# Patient Record
Sex: FEMALE | Race: WHITE | NOT HISPANIC OR LATINO | URBAN - METROPOLITAN AREA
[De-identification: names, ages, dates, MRNs, and addresses within clinical notes are randomized per-mention and may not be internally consistent; named-entity substitution may affect disease eponyms.]

---

## 2017-07-11 DIAGNOSIS — R92.2 INCONCLUSIVE MAMMOGRAM: ICD-10-CM

## 2018-11-15 ENCOUNTER — EMERGENCY (EMERGENCY)
Facility: HOSPITAL | Age: 43
LOS: 1 days | Discharge: HOME | End: 2018-11-15
Attending: EMERGENCY MEDICINE | Admitting: EMERGENCY MEDICINE
Payer: SELF-PAY

## 2018-11-15 VITALS
WEIGHT: 134.92 LBS | SYSTOLIC BLOOD PRESSURE: 119 MMHG | DIASTOLIC BLOOD PRESSURE: 83 MMHG | HEART RATE: 77 BPM | TEMPERATURE: 98 F | RESPIRATION RATE: 16 BRPM | OXYGEN SATURATION: 96 % | HEIGHT: 65 IN

## 2018-11-15 PROCEDURE — 70160 X-RAY EXAM OF NASAL BONES: CPT

## 2018-11-15 PROCEDURE — 70160 X-RAY EXAM OF NASAL BONES: CPT | Mod: 26

## 2018-11-15 PROCEDURE — 99283 EMERGENCY DEPT VISIT LOW MDM: CPT | Mod: 25

## 2018-11-15 PROCEDURE — 99283 EMERGENCY DEPT VISIT LOW MDM: CPT

## 2018-11-15 NOTE — ED PROVIDER NOTE - MEDICAL DECISION MAKING DETAILS
44 y/o F pt presents to the ED c/o nasal injury, epistaxis since today. Will XR nasal bones then reassess.

## 2018-11-15 NOTE — ED ADULT NURSE NOTE - OBJECTIVE STATEMENT
patient has poss broken nose today, patient stated [place] the shopping [place] and opening up a case and punched her nose accidently, no nose bleed and noted swollen nose, Pt is in no acute distress. c/o minimal pain at this time, ice bag is given. MD at bedside, will continue to monitor.

## 2018-11-15 NOTE — ED PROVIDER NOTE - OBJECTIVE STATEMENT
44 y/o F pt with no pertinent PMHx presents to the ED c/o nasal injury, epistaxis since today. Pt reports trying to unscrew an item while at home, states the item snapped and she hit her nose. Pt experienced epistaxis at time of incident but denies current bleeding. No further complaints at this time.

## 2018-11-15 NOTE — ED PROVIDER NOTE - ENMT, MLM
Airway patent, Nasal mucosa clear. Mouth with normal mucosa. No significant nasal deformity or bruising noted.

## 2018-11-15 NOTE — ED ADULT NURSE NOTE - NSIMPLEMENTINTERV_GEN_ALL_ED
Implemented All Universal Safety Interventions:  [place] to call system. Call bell, personal items and telephone within reach. Instruct patient to call for assistance. Room bathroom lighting operational. Non-slip footwear when patient is off stretcher. Physically safe environment: no spills, clutter or unnecessary equipment. Stretcher in lowest position, wheels locked, appropriate side rails in place.

## 2020-01-06 ENCOUNTER — APPOINTMENT (OUTPATIENT)
Dept: SURGERY | Facility: CLINIC | Age: 45
End: 2020-01-06
Payer: COMMERCIAL

## 2020-01-06 VITALS
HEART RATE: 86 BPM | BODY MASS INDEX: 21.66 KG/M2 | DIASTOLIC BLOOD PRESSURE: 78 MMHG | WEIGHT: 130 LBS | SYSTOLIC BLOOD PRESSURE: 117 MMHG | HEIGHT: 65 IN

## 2020-01-06 PROCEDURE — 99204 OFFICE O/P NEW MOD 45 MIN: CPT

## 2020-01-06 RX ORDER — FAMOTIDINE 40 MG/1
TABLET, FILM COATED ORAL
Refills: 0 | Status: ACTIVE | COMMUNITY

## 2020-01-08 NOTE — HISTORY OF PRESENT ILLNESS
[de-identified] : Patient referred by Dr. Pandey for evaluation of newly diagnosed papillary thyroid cancer. 14 years ago patient with thyroid dysfunction postpartum which resolved. Currently not on thyroid medication. Patient reports elevated thyroid antibodies. 7 years ago diagnosed with thyroid nodules followed by Dr. Teena Ling with prior benign biopsies. Patient has changed endocrinologists due to insurance.  Currently under the care of Dr. Ortiz. Thyroid ultrasound December 2019: Right lobe 5.6 x 2 x 1.6 CM with mid 6 mm nodularity stable. The left lobe 4.7 x 1.6 x 1.8 CM with 14 mm upper nodule not previously seen isthmus 8 mm hypoechoic nodule with microcalcifications slightly increased compared to prior study 2017.  12 mm TGDC noted 12 cm above manubrium. Biopsy left nodule benign, left isthmus positive for papillary thyroid cancer. Patient denies dysphagia reports occasional hoarseness with severe reflux, denies XRT.\par Patient with long hx of TGDC without change in size or inflammation.

## 2020-01-08 NOTE — CONSULT LETTER
[Dear  ___] : Dear  [unfilled], [Consult Letter:] : I had the pleasure of evaluating your patient, [unfilled]. [Consult Closing:] : Thank you very much for allowing me to participate in the care of this patient.  If you have any questions, please do not hesitate to contact me. [Please see my note below.] : Please see my note below. [DrNils  ___] : Dr. CRUZ [DrNils ___] : Dr. CRUZ [FreeTextEntry2] : Dr. Baird Port [FreeTextEntry3] : Sincerely yours,\par \par Karen Arauz MD, FACS\par Assistant Professor of Surgery\par [facility]

## 2020-01-08 NOTE — REASON FOR VISIT
[Initial Consultation] : an initial consultation for [Spouse] : spouse [Other: _____] : [unfilled] [FreeTextEntry2] : papillary thyroid cancer in [facility]

## 2020-01-08 NOTE — ASSESSMENT
[FreeTextEntry1] : Patient with newly diagnosed papillary thyroid cancer in a multinodular goiter. I recommended surgery for definitive diagnosis and treatment. I discussed a left thyroid lobectomy and isthmusectomy versus total thyroidectomy with the patient. She would like to proceed with a total thyroidectomy for definitive diagnosis. Potential need for postoperative radioactive iodine discussed if metastatic adenopathy identified.I have discussed the risks, benefits and alternative treatments which include but are not limited to bleeding, infection, numbness, hoarseness, hypocalcemia, scarring, and need for reoperation. I have answered the patient's questions. They will contact my office to schedule surgery.

## 2020-01-08 NOTE — PHYSICAL EXAM
[Normal] : palpation of lymph nodes is normal [de-identified] : no cervical or supraclavicular adenopathy, trachea midline, thyroid without enlargement with left nodule 1.5 cm smooth non tender.  1 cm smooth cyst at level of TGDC without tenderness or inflammation. [de-identified] : Skin:  normal appearance.  no rash, nodules, vesicles, or erythema,\par Musculoskeletal:  full range of motion and no deformities appreciated\par Neurological:  grossly intact\par Psychiatric:  oriented to person, place and time with appropriate affect

## 2020-01-17 ENCOUNTER — APPOINTMENT (OUTPATIENT)
Dept: SURGERY | Facility: CLINIC | Age: 45
End: 2020-01-17
Payer: COMMERCIAL

## 2020-01-17 PROCEDURE — 60252 REMOVAL OF THYROID: CPT

## 2020-01-22 ENCOUNTER — LABORATORY RESULT (OUTPATIENT)
Age: 45
End: 2020-01-22

## 2020-01-22 ENCOUNTER — APPOINTMENT (OUTPATIENT)
Dept: SURGERY | Facility: CLINIC | Age: 45
End: 2020-01-22
Payer: COMMERCIAL

## 2020-01-22 PROCEDURE — 36415 COLL VENOUS BLD VENIPUNCTURE: CPT

## 2020-01-22 PROCEDURE — 99024 POSTOP FOLLOW-UP VISIT: CPT

## 2020-01-22 NOTE — PHYSICAL EXAM
[de-identified] : no cervical or supraclavicular adenopathy, trachea midline, incision with mild swelling, scar min discussed [de-identified] : Skin:  normal appearance.  no rash, nodules, vesicles, or erythema,\par Musculoskeletal:  full range of motion and no deformities appreciated\par Neurological:  grossly intact\par Psychiatric:  oriented to person, place and time with appropriate affect [Normal] : palpation of lymph nodes is normal

## 2020-01-22 NOTE — HISTORY OF PRESENT ILLNESS
[de-identified] : Patient referred by Dr. Pandey for evaluation of newly diagnosed papillary thyroid cancer. 14 years ago patient with thyroid dysfunction postpartum which resolved. Currently not on thyroid medication. Patient reports elevated thyroid antibodies. 7 years ago diagnosed with thyroid nodules followed by Dr. Teena Ling with prior benign biopsies. Patient has changed endocrinologists due to insurance.  Currently under the care of Dr. Ortiz. Thyroid ultrasound December 2019: Right lobe 5.6 x 2 x 1.6 CM with mid 6 mm nodularity stable. The left lobe 4.7 x 1.6 x 1.8 CM with 14 mm upper nodule not previously seen isthmus 8 mm hypoechoic nodule with microcalcifications slightly increased compared to prior study 2017.  12 mm TGDC noted 12 cm above manubrium. Biopsy left nodule benign, left isthmus positive for papillary thyroid cancer. Patient denies dysphagia reports occasional hoarseness with severe reflux, denies XRT.\par Patient with long hx of TGDC without change in size or inflammation. \par 1/17/20 left thyroid lobectomy with paratracheal dissection and parathyroidectomy .  path 9 mm papillary with 1/4 LN positive with 1.5 mm focus, hypercellular parathyroid, denies dysphagia, hoarseness or pain. reports palpitations and anxiety.

## 2020-01-22 NOTE — ASSESSMENT
[FreeTextEntry1] : Patient with newly diagnosed papillary thyroid cancer in a multinodular goiter.  Doing well, multiple questions answered.  recommend observations. michael nair, will present at tumor board,  RTo 6 weeks.

## 2020-01-27 LAB
25(OH)D3 SERPL-MCNC: 47.7 NG/ML
CALCIUM SERPL-MCNC: 8.9 MG/DL
CALCIUM SERPL-MCNC: 8.9 MG/DL
PARATHYROID HORMONE INTACT: 16 PG/ML
T3 SERPL-MCNC: 93 NG/DL
T4 FREE SERPL-MCNC: 1.2 NG/DL
THYROGLOB AB SERPL-ACNC: <20 IU/ML
THYROGLOB SERPL-MCNC: 17.9 NG/ML
TSH SERPL-ACNC: 1.99 UIU/ML

## 2020-01-31 ENCOUNTER — RESULT REVIEW (OUTPATIENT)
Age: 45
End: 2020-01-31

## 2020-03-04 ENCOUNTER — APPOINTMENT (OUTPATIENT)
Dept: SURGERY | Facility: CLINIC | Age: 45
End: 2020-03-04
Payer: COMMERCIAL

## 2020-03-04 ENCOUNTER — LABORATORY RESULT (OUTPATIENT)
Age: 45
End: 2020-03-04

## 2020-03-04 PROCEDURE — 36415 COLL VENOUS BLD VENIPUNCTURE: CPT

## 2020-03-04 PROCEDURE — 99024 POSTOP FOLLOW-UP VISIT: CPT

## 2020-03-04 NOTE — PHYSICAL EXAM
[de-identified] : no cervical or supraclavicular adenopathy, trachea midline, incision with healing well. , scar min discussed [Normal] : palpation of lymph nodes is normal [de-identified] : Skin:  normal appearance.  no rash, nodules, vesicles, or erythema,\par Musculoskeletal:  full range of motion and no deformities appreciated\par Neurological:  grossly intact\par Psychiatric:  oriented to person, place and time with appropriate affect

## 2020-03-04 NOTE — ASSESSMENT
[FreeTextEntry1] : Patient with newly diagnosed papillary thyroid cancer in a multinodular goiter.  Doing well, multiple questions answered.  recommend observations. michael sent, thyroid US 7/2020, RTO 4 mo

## 2020-03-04 NOTE — HISTORY OF PRESENT ILLNESS
[de-identified] : Patient referred by Dr. Pandey for evaluation of newly diagnosed papillary thyroid cancer. 14 years ago patient with thyroid dysfunction postpartum which resolved. Currently not on thyroid medication. Patient reports elevated thyroid antibodies. 7 years ago diagnosed with thyroid nodules followed by Dr. Teena Ling with prior benign biopsies. Patient has changed endocrinologists due to insurance.  Currently under the care of Dr. Ortiz. Thyroid ultrasound December 2019: Right lobe 5.6 x 2 x 1.6 CM with mid 6 mm nodularity stable. The left lobe 4.7 x 1.6 x 1.8 CM with 14 mm upper nodule not previously seen isthmus 8 mm hypoechoic nodule with microcalcifications slightly increased compared to prior study 2017.  12 mm TGDC noted 12 cm above manubrium. Biopsy left nodule benign, left isthmus positive for papillary thyroid cancer. Patient denies dysphagia reports occasional hoarseness with severe reflux, denies XRT.\par Patient with long hx of TGDC without change in size or inflammation. \par 1/17/20 left thyroid lobectomy with paratracheal dissection and parathyroidectomy .  path 9 mm papillary with 1/4 LN positive with 1.5 mm focus, hypercellular parathyroid, denies dysphagia, hoarseness or pain. Patient's  palpitations and anxiety improving.  Case presented at tumor board, recommended observation inview of small focus in LN.  multiple questions answered.  Plans to see Dr Teena Ling.

## 2020-03-06 LAB
25(OH)D3 SERPL-MCNC: 40.1 NG/ML
CALCIUM SERPL-MCNC: 9 MG/DL
CALCIUM SERPL-MCNC: 9 MG/DL
PARATHYROID HORMONE INTACT: 26 PG/ML
T3 SERPL-MCNC: 98 NG/DL
T4 FREE SERPL-MCNC: 1.1 NG/DL
THYROGLOB AB SERPL-ACNC: <20 IU/ML
THYROGLOB SERPL-MCNC: 9.86 NG/ML
TSH SERPL-ACNC: 2.74 UIU/ML

## 2020-04-20 ENCOUNTER — LABORATORY RESULT (OUTPATIENT)
Age: 45
End: 2020-04-20

## 2020-04-20 ENCOUNTER — APPOINTMENT (OUTPATIENT)
Dept: SURGERY | Facility: CLINIC | Age: 45
End: 2020-04-20
Payer: COMMERCIAL

## 2020-04-20 DIAGNOSIS — E04.2 NONTOXIC MULTINODULAR GOITER: ICD-10-CM

## 2020-04-20 PROCEDURE — 36415 COLL VENOUS BLD VENIPUNCTURE: CPT

## 2020-04-20 PROCEDURE — 99214 OFFICE O/P EST MOD 30 MIN: CPT

## 2020-04-20 NOTE — PHYSICAL EXAM
[de-identified] : no cervical or supraclavicular adenopathy, trachea midline, incision with healing well. , scar min discussed.  Area of patient's concern at right posterior auricular area with subcm LN over styloid process, no suspicious findings.  [Normal] : palpation of lymph nodes is normal [de-identified] : Skin:  normal appearance.  no rash, nodules, vesicles, or erythema,\par Musculoskeletal:  full range of motion and no deformities appreciated\par Neurological:  grossly intact\par Psychiatric:  oriented to person, place and time with appropriate affect

## 2020-04-20 NOTE — ASSESSMENT
[FreeTextEntry1] : Patient with newly diagnosed papillary thyroid cancer in a multinodular goiter.  Doing well, multiple questions answered.  recommend observations. michael sent, patient will consider starting synthoid after levels reviewed.  thyroid US 7/2020, RTO 3 mo

## 2020-04-20 NOTE — HISTORY OF PRESENT ILLNESS
[de-identified] : Patient referred by Dr. Pandey for evaluation of newly diagnosed papillary thyroid cancer. 14 years ago patient with thyroid dysfunction postpartum which resolved. Currently not on thyroid medication. Patient reports elevated thyroid antibodies. 7 years ago diagnosed with thyroid nodules followed by Dr. Teena Ling with prior benign biopsies. Patient has changed endocrinologists due to insurance.  Currently under the care of Dr. Ortiz. Thyroid ultrasound December 2019: Right lobe 5.6 x 2 x 1.6 CM with mid 6 mm nodularity stable. The left lobe 4.7 x 1.6 x 1.8 CM with 14 mm upper nodule not previously seen isthmus 8 mm hypoechoic nodule with microcalcifications slightly increased compared to prior study 2017.  12 mm TGDC noted 12 cm above manubrium. Biopsy left nodule benign, left isthmus positive for papillary thyroid cancer. Patient denies dysphagia reports occasional hoarseness with severe reflux, denies XRT.\par Patient with long hx of TGDC without change in size or inflammation. \par 1/17/20 left thyroid lobectomy with paratracheal dissection and parathyroidectomy .  path 9 mm papillary with 1/4 LN positive with 1.5 mm focus, hypercellular parathyroid, denies dysphagia, hoarseness or pain. Patient's  palpitations and anxiety improving.  Case presented at tumor board, recommended observation in view of small focus in LN.  \par Patient saw  Dr Teena Ling who recommended starting low dose synthroid. She has not started it yet. Patient returns prior to scheduled appt with c/o right posterior auricular nodule with tenderness and intermittent dysphagia.  Patient scheduled next month for f/u of with GI.  Has restarted pepcid.

## 2020-04-27 LAB
25(OH)D3 SERPL-MCNC: 41 NG/ML
CALCIUM SERPL-MCNC: 9.2 MG/DL
CALCIUM SERPL-MCNC: 9.2 MG/DL
PARATHYROID HORMONE INTACT: 21 PG/ML
T3 SERPL-MCNC: 101 NG/DL
T4 FREE SERPL-MCNC: 0.9 NG/DL
THYROGLOB AB SERPL-ACNC: <20 IU/ML
THYROGLOB SERPL-MCNC: 22.7 NG/ML
TSH SERPL-ACNC: 3.61 UIU/ML

## 2020-07-06 ENCOUNTER — APPOINTMENT (OUTPATIENT)
Dept: SURGERY | Facility: CLINIC | Age: 45
End: 2020-07-06
Payer: COMMERCIAL

## 2020-07-06 ENCOUNTER — LABORATORY RESULT (OUTPATIENT)
Age: 45
End: 2020-07-06

## 2020-07-06 DIAGNOSIS — C77.9 SECONDARY AND UNSPECIFIED MALIGNANT NEOPLASM OF LYMPH NODE, UNSPECIFIED: ICD-10-CM

## 2020-07-06 DIAGNOSIS — C73 MALIGNANT NEOPLASM OF THYROID GLAND: ICD-10-CM

## 2020-07-06 DIAGNOSIS — E21.0 PRIMARY HYPERPARATHYROIDISM: ICD-10-CM

## 2020-07-06 PROCEDURE — 99214 OFFICE O/P EST MOD 30 MIN: CPT

## 2020-07-06 PROCEDURE — 36415 COLL VENOUS BLD VENIPUNCTURE: CPT

## 2020-07-06 NOTE — PHYSICAL EXAM
[de-identified] : no cervical or supraclavicular adenopathy, trachea midline, incision with healing well. , scar min discussed.   [Normal] : assessment of respiratory effort is normal [de-identified] : Skin:  normal appearance.  no rash, nodules, vesicles, or erythema,\par Musculoskeletal:  full range of motion and no deformities appreciated\par Neurological:  grossly intact\par Psychiatric:  oriented to person, place and time with appropriate affect

## 2020-07-06 NOTE — ASSESSMENT
[FreeTextEntry1] : Patient with newly diagnosed papillary thyroid cancer in a multinodular goiter with small focus of Cancer in LN.   Doing well, multiple questions answered.    Discussed observation versus completion thyroidectomy.  Patient considering second opinion at [facility] or with Dr Bass.    . michael nair,  thyroid US 1/2021, RTO 6 mo

## 2020-07-06 NOTE — HISTORY OF PRESENT ILLNESS
[de-identified] : Patient referred by Dr. Pandey for evaluation of newly diagnosed papillary thyroid cancer. 14 years ago patient with thyroid dysfunction postpartum which resolved. Currently not on thyroid medication. Patient reports elevated thyroid antibodies. 7 years ago diagnosed with thyroid nodules followed by Dr. Teena Ling with prior benign biopsies. Patient has changed endocrinologists due to insurance.  Currently under the care of Dr. Ortiz. Thyroid ultrasound December 2019: Right lobe 5.6 x 2 x 1.6 CM with mid 6 mm nodularity stable. The left lobe 4.7 x 1.6 x 1.8 CM with 14 mm upper nodule not previously seen isthmus 8 mm hypoechoic nodule with microcalcifications slightly increased compared to prior study 2017.  12 mm TGDC noted 12 cm above manubrium. Biopsy left nodule benign, left isthmus positive for papillary thyroid cancer. Patient denies dysphagia reports occasional hoarseness with severe reflux, denies XRT.\par Patient with long hx of TGDC without change in size or inflammation. \par 1/17/20 left thyroid lobectomy with paratracheal dissection and parathyroidectomy .  path 9 mm papillary with 1/4 LN positive with 1.5 mm focus, hypercellular parathyroid,  Case presented at tumor board, recommended observation in view of small focus in LN.  \par Patient saw  Dr Teena Ling who recommended starting low dose synthroid. Patient took 25 mcg fdaily for a while with c/o hair loss and abd weight gain. she decreased to QOD and stopped 5 days ago.   thyroid US 7/1/2020 no right thyroid nodules no adenopathy or evidence of recurrence.

## 2020-07-09 LAB
T3 SERPL-MCNC: 99 NG/DL
T4 FREE SERPL-MCNC: 1.1 NG/DL
THYROGLOB AB SERPL-ACNC: 20.1 IU/ML
THYROGLOB SERPL-MCNC: 2.38 NG/ML
TSH SERPL-ACNC: 2.95 UIU/ML

## 2021-01-11 ENCOUNTER — APPOINTMENT (OUTPATIENT)
Dept: SURGERY | Facility: CLINIC | Age: 46
End: 2021-01-11

## 2021-08-20 NOTE — ED ADULT TRIAGE NOTE - WEIGHT IN LBS
Aware of likely dc home tomorrow with [facility]. This writer contacted [facility] liaison Betina Peace) [phone], to make him aware of potential dc.     Sissy Holden RN 134.9

## 2021-09-22 ENCOUNTER — NON-APPOINTMENT (OUTPATIENT)
Age: 46
End: 2021-09-22

## 2022-02-14 NOTE — ED PROVIDER NOTE - AGGRAVATING FACTORS
He does admit to fatigue  He is fatigued throughout the day  He does report a nightmares at night vivid dreams  He also describes movements in his legs  He may have a REM disorder  I have asked him to see sleep determine there is any other causes  He may require a sleep evaluation he also gives history of restlessness in his legs  I have asked him to obtain an iron profile  He does admit to eating well but after the and profile is done he can add some more spinach to his diet  We did provide him with a prescription of gabapentin of 300 mg at bedtime he can try    The excessive drowsiness may be and fatigue may be from a lack of restorative sleep none

## 2022-05-23 ENCOUNTER — RESULT REVIEW (OUTPATIENT)
Age: 47
End: 2022-05-23

## 2023-01-27 ENCOUNTER — EMERGENCY (EMERGENCY)
Facility: HOSPITAL | Age: 48
LOS: 1 days | Discharge: HOME | End: 2023-01-27
Attending: STUDENT IN AN ORGANIZED HEALTH CARE EDUCATION/TRAINING PROGRAM | Admitting: STUDENT IN AN ORGANIZED HEALTH CARE EDUCATION/TRAINING PROGRAM
Payer: COMMERCIAL

## 2023-01-27 VITALS
WEIGHT: 130.07 LBS | SYSTOLIC BLOOD PRESSURE: 127 MMHG | RESPIRATION RATE: 16 BRPM | OXYGEN SATURATION: 96 % | HEIGHT: 65 IN | DIASTOLIC BLOOD PRESSURE: 78 MMHG | HEART RATE: 83 BPM | TEMPERATURE: 98 F

## 2023-01-27 LAB — S PYO DNA THROAT QL NAA+PROBE: SIGNIFICANT CHANGE UP

## 2023-01-27 PROCEDURE — 87651 STREP A DNA AMP PROBE: CPT

## 2023-01-27 PROCEDURE — 87798 DETECT AGENT NOS DNA AMP: CPT

## 2023-01-27 PROCEDURE — 99284 EMERGENCY DEPT VISIT MOD MDM: CPT

## 2023-01-27 PROCEDURE — 99282 EMERGENCY DEPT VISIT SF MDM: CPT

## 2023-01-27 NOTE — ED ADULT NURSE NOTE - BREATHING, MLM
"Subjective:       Patient ID: Cinthya Hernandez is a 22 y.o. female.    Vitals:  height is 5' 5.5" (1.664 m) and weight is 84.8 kg (187 lb). Her oral temperature is 98 °F (36.7 °C). Her blood pressure is 114/68 and her pulse is 81. Her respiration is 16 and oxygen saturation is 97%.     Chief Complaint: Sore Throat (Yesterday started with a headache, nausea, body aches, throat started to hurt a little but was worse this morning.  Hurts to swallow)    Patient is a 22-year-old female who presents to clinic for evaluation of sore throat.  Patient reports she is not vaccinated for flu or influenza.  Patient reports positive sick exposure to both family members and coworkers.  Patient states her coworkers have tested positive for the flu.  Patient states no over-the-counter medications for symptoms at this point.  Patient reports symptoms times 2-3 days now.  Patient complaining of fatigue, chills, nasal sinus congestion with postnasal drainage, sore throat with painful swallowing, a slight nonproductive cough, intermittent episodes of nausea, generalized body aches, and generalized headaches.  Patient denies any acute dizziness, ear pain, chest pain or palpitations, shortness of breath or abnormal breath sounds, abdominal pain, vomiting or diarrhea, urinary symptoms, rash, or change in mentation.      Constitution: Positive for chills and fatigue.   HENT: Positive for congestion, postnasal drip, sore throat and trouble swallowing (Painful swallowing). Negative for ear pain.    Neck: neck negative. Negative for painful lymph nodes.   Cardiovascular: Negative.  Negative for chest pain and palpitations.   Eyes: Negative.    Respiratory: Positive for cough (Not bad). Negative for chest tightness, shortness of breath and wheezing.    Gastrointestinal: Positive for nausea. Negative for abdominal pain, vomiting and diarrhea.   Endocrine: negative.   Genitourinary: Negative.  Negative for dysuria, frequency and urgency. "   Musculoskeletal: Positive for muscle ache.   Skin: Negative.  Negative for color change, pale, rash and erythema.   Allergic/Immunologic: Negative.    Neurological: Positive for headaches. Negative for dizziness, light-headedness, passing out, disorientation and altered mental status.   Hematologic/Lymphatic: Negative.  Negative for swollen lymph nodes.   Psychiatric/Behavioral: Negative.  Negative for altered mental status, disorientation and confusion.       Objective:      Physical Exam   Constitutional: She is oriented to person, place, and time. She appears well-developed. She is cooperative.  Non-toxic appearance. She appears ill (Uncomfortable). No distress.   HENT:   Head: Normocephalic and atraumatic.   Ears:   Right Ear: Hearing, tympanic membrane, external ear and ear canal normal.   Left Ear: Hearing, tympanic membrane, external ear and ear canal normal.   Nose: Congestion present. No mucosal edema, rhinorrhea or nasal deformity. No epistaxis. Right sinus exhibits no maxillary sinus tenderness and no frontal sinus tenderness. Left sinus exhibits no maxillary sinus tenderness and no frontal sinus tenderness.   Mouth/Throat: Uvula is midline and mucous membranes are normal. Mucous membranes are moist. No trismus in the jaw. Normal dentition. No uvula swelling. Posterior oropharyngeal erythema and cobblestoning present. Tonsils are 2+ on the right. Tonsils are 1+ on the left. Tonsillar exudate. Oropharynx is clear.   Eyes: Conjunctivae and lids are normal. Pupils are equal, round, and reactive to light. Right eye exhibits no discharge. Left eye exhibits no discharge. No scleral icterus.   Neck: Trachea normal and phonation normal. Neck supple. No neck rigidity present.   Cardiovascular: Normal rate, regular rhythm, normal heart sounds and normal pulses.   Pulmonary/Chest: Effort normal and breath sounds normal. No respiratory distress. She has no wheezes. She has no rhonchi. She has no rales.   Abdominal:  Normal appearance and bowel sounds are normal. She exhibits no distension. Soft. There is no abdominal tenderness.   Musculoskeletal: Normal range of motion.         General: No deformity. Normal range of motion.   Lymphadenopathy:     She has cervical adenopathy.   Neurological: She is alert and oriented to person, place, and time. She exhibits normal muscle tone. Coordination normal.   Skin: Skin is warm, dry, intact, not diaphoretic, not pale and no rash. Capillary refill takes less than 2 seconds. No erythema   Psychiatric: Her speech is normal and behavior is normal. Judgment and thought content normal.   Nursing note and vitals reviewed.        Assessment:       1. Sore throat    2. Generalized body aches    3. Acute pharyngitis, unspecified etiology          Plan:         Sore throat  -     POCT Influenza A/B  -     POCT rapid strep A  -     SARS Coronavirus 2 Antigen, POCT Manual Read    Generalized body aches  -     POCT Influenza A/B  -     POCT rapid strep A  -     SARS Coronavirus 2 Antigen, POCT Manual Read    Acute pharyngitis, unspecified etiology    Other orders  -     amoxicillin (AMOXIL) 500 MG Tab; Take 1 tablet (500 mg total) by mouth every 12 (twelve) hours. for 10 days  Dispense: 20 tablet; Refill: 0  -     benzocaine-menthoL (CHLORASEPTIC MAX) 15-10 mg Lozg; 1 lozenge by Mucous Membrane route every 2 (two) hours as needed (Sore throat).  Dispense: 30 lozenge; Refill: 0                 Labs:  Rapid strep positive.  Influenza A and B negative.  COVID negative.  Take medications as prescribed.  Tylenol/Motrin per package instructions for any pain or fever.  Recommend warm salt water gargles every 2-3 hours while awake.  Recommend replacing toothbrush and washing linens in hot water 24-48 hours after starting antibiotics.  Follow-up with PCP in 1-2 days.  Follow-up ENT as needed.  Return to clinic as needed.  To ED for any new or acutely worsening symptoms.  Patient in agreement with plan of  care.    DISCLAIMER: Please note that my documentation in this Electronic Healthcare Record was produced using speech recognition software and therefore may contain errors related to that software system.These could include grammar, punctuation and spelling errors or the inclusion/exclusion of phrases that were not intended. Garbled syntax, mangled pronouns, and other bizarre constructions may be attributed to that software system.   Spontaneous, unlabored and symmetrical

## 2023-01-28 VITALS
HEART RATE: 69 BPM | OXYGEN SATURATION: 98 % | RESPIRATION RATE: 18 BRPM | DIASTOLIC BLOOD PRESSURE: 74 MMHG | SYSTOLIC BLOOD PRESSURE: 113 MMHG | TEMPERATURE: 98 F

## 2023-01-28 DIAGNOSIS — Z90.89 ACQUIRED ABSENCE OF OTHER ORGANS: Chronic | ICD-10-CM

## 2023-01-28 RX ORDER — LEVOTHYROXINE SODIUM 125 MCG
1 TABLET ORAL
Qty: 0 | Refills: 0 | DISCHARGE

## 2023-01-28 RX ORDER — FLUOXETINE HCL 10 MG
1 CAPSULE ORAL
Qty: 0 | Refills: 0 | DISCHARGE

## 2023-01-28 NOTE — ED PROVIDER NOTE - PATIENT PORTAL LINK FT
You can access the FollowMyHealth Patient Portal offered by [facility] by registering at the following website: [email or web address]. By joining [facility] FollowMyHealth portal, you will also be able to view your health information using other applications (apps) compatible with our system.

## 2023-01-28 NOTE — ED PROVIDER NOTE - CLINICAL SUMMARY MEDICAL DECISION MAKING FREE TEXT BOX
47 year old female p/w right sided throat pain x 2 days.  No fever.  Tolerating PO.  Saw PMD yesterday, started on Doxy but no strep test done.  VSS, no acute distress, no focal findings on ENT exam.  Strep PCR, consider CT neck soft tissue, ENT follow up, r/o strep, abscess, viral pharyngitis

## 2023-01-28 NOTE — ED PROVIDER NOTE - OBJECTIVE STATEMENT
47 year old female with a history of thyroid CA presents with right-sided throat pain x 2 days.  She states it hurts when she swallows but is able to tolerate PO.  Denies fever, headache, ear pain, drooling.  She saw her PMD yesterday who did blood work (results pending) and started patient on Doxycycline 100mg which she has taken for the last 2 days.  Patient states she was not tested for strep.  Prior to start of symptoms, patient had 2 weeks of viral URI sx which have now resolved.   She is scheduled to see her endocrinologist next week to have a routine thyroid ultrasound.  PMD Petar Schreiber

## 2023-01-28 NOTE — ED PROVIDER NOTE - PROGRESS NOTE DETAILS
Results of strep d/w patient.  Offered CT neck soft tissue but patient declining.  States she would continue abx, gargle, and use chloroseptic spray.  Understands to return to the ED for any concerns or changes.  Vitals stable, patient tolerating PO and in no acute distress.

## 2023-01-28 NOTE — ED PROVIDER NOTE - ENMT, MLM
Airway patent, Nasal mucosa clear. Mouth with normal mucosa. Throat has no vesicles, no oropharyngeal exudates and uvula is midline.  +Enlarged anterior cervical lymphadenopathy bilaterally. TMs visualized and clear

## 2023-01-28 NOTE — ED PROVIDER NOTE - NSFOLLOWUPINSTRUCTIONS_ED_ALL_ED_FT
Please follow up with your primary care doctor and ENT.  Continue taking your antibiotic.  Return to the ER for persistent sore throat, fever, inability to tolerate liquids, inability to swallow, headache, or any other concerns.       Pharyngitis    Upper body outline including the pharynx.   Pharyngitis is a sore throat (pharynx). This is when there is redness, pain, and swelling in your throat. Most of the time, this condition gets better on its own. In some cases, you may need medicine.      What are the causes?    •An infection from a virus.      •An infection from bacteria.      •Allergies.        What increases the risk?    •Being 5–24 years old.    •Being in crowded environments. These include:  •Daycares.      •Schools.      •Dormitories.        •Living in a place with cold temperatures outside.      •Having a weakened disease-fighting (immune) system.        What are the signs or symptoms?    Symptoms may vary depending on the cause. Common symptoms include:  •Sore throat.      •Tiredness (fatigue).      •Low-grade fever.      •Stuffy nose.      •Cough.      •Headache.      Other symptoms may include:  •Glands in the neck (lymph nodes) that are swollen.      •Skin rashes.      •Film on the throat or tonsils. This can be caused by an infection from bacteria.      •Vomiting.      •Red, itchy eyes.      •Loss of appetite.      •Joint pain and muscle aches.      •Tonsils that are temporarily bigger than usual (enlarged).        How is this treated?    Many times, treatment is not needed. This condition usually gets better in 3–4 days without treatment.    If the infection is caused by a bacteria, you may be need to take antibiotics.      Follow these instructions at home:    Medicines     •Take over-the-counter and prescription medicines only as told by your doctor.      •If you were prescribed an antibiotic medicine, take it as told by your doctor. Do not stop taking the antibiotic even if you start to feel better.      •Use throat lozenges or sprays to soothe your throat as told by your doctor.      •Children can get pharyngitis. Do not give your child aspirin.        Managing pain   A cup of hot tea.   To help with pain, try:•Sipping warm liquids, such as:  •Broth.      •Herbal tea.      •Warm water.        •Eating or drinking cold or frozen liquids, such as frozen ice pops.    •Rinsing your mouth (gargle) with a salt water mixture 3–4 times a day or as needed.  •To make salt water, dissolve ½–1 tsp (3–6 g) of salt in 1 cup (237 mL) of warm water.      •Do not swallow this mixture.        •Sucking on hard candy or throat lozenges.      •Putting a cool-mist humidifier in your bedroom at night to moisten the air.      •Sitting in the bathroom with the door closed for 5–10 minutes while you run hot water in the shower.        General instructions   A do not smoke cigarettes sign.   • Do not smoke or use any products that contain nicotine or tobacco. If you need help quitting, ask your doctor.      •Rest as told by your doctor.      •Drink enough fluid to keep your pee (urine) pale yellow.        How is this prevented?    •Wash your hands often for at least 20 seconds with soap and water. If soap and water are not available, use hand .      • Do not touch your eyes, nose, or mouth with unwashed hands. Wash hands after touching these areas.      • Do not share cups or eating utensils.      •Avoid close contact with people who are sick.        Contact a doctor if:    •You have large, tender lumps in your neck.      •You have a rash.      •You cough up green, yellow-brown, or bloody spit.        Get help right away if:    •You have a stiff neck.      •You drool or cannot swallow liquids.      •You cannot drink or take medicines without vomiting.      •You have very bad pain that does not go away with medicine.      •You have problems breathing, and it is not from a stuffy nose.      •You have new pain and swelling in your knees, ankles, wrists, or elbows.      These symptoms may be an emergency. Get help right away. Call your local emergency services (911 in the [place]   • Do not wait to see if the symptoms will go away.       • Do not drive yourself to the hospital.         Summary    •Pharyngitis is a sore throat (pharynx). This is when there is redness, pain, and swelling in your throat.      •Most of the time, pharyngitis gets better on its own. Sometimes, you may need medicine.      •If you were prescribed an antibiotic medicine, take it as told by your doctor. Do not stop taking the antibiotic even if you start to feel better.      This information is not intended to replace advice given to you by your health care provider. Make sure you discuss any questions you have with your health care provider.

## 2023-03-31 NOTE — ED PROVIDER NOTE - CARE PROVIDER_API CALL
No
Petar Schreiber AND [facility] [place] [address]  Phone: [phone]  Fax: [phone]  Follow Up Time:     Jarvis Hair)  Otolaryngology  [address], [address]  [place] [address]  Phone: [phone]  Fax: [phone]  Follow Up Time:

## 2024-12-24 ENCOUNTER — EMERGENCY (EMERGENCY)
Facility: HOSPITAL | Age: 49
LOS: 1 days | Discharge: HOME | End: 2024-12-24
Attending: EMERGENCY MEDICINE | Admitting: EMERGENCY MEDICINE
Payer: COMMERCIAL

## 2024-12-24 VITALS
DIASTOLIC BLOOD PRESSURE: 84 MMHG | HEART RATE: 104 BPM | HEIGHT: 66 IN | OXYGEN SATURATION: 99 % | SYSTOLIC BLOOD PRESSURE: 124 MMHG | WEIGHT: 130.07 LBS | RESPIRATION RATE: 16 BRPM | TEMPERATURE: 98 F

## 2024-12-24 DIAGNOSIS — Z90.89 ACQUIRED ABSENCE OF OTHER ORGANS: Chronic | ICD-10-CM

## 2024-12-24 PROBLEM — Z86.59 PERSONAL HISTORY OF OTHER MENTAL AND BEHAVIORAL DISORDERS: Chronic | Status: ACTIVE | Noted: 2023-01-28

## 2024-12-24 PROBLEM — C73 MALIGNANT NEOPLASM OF THYROID GLAND: Chronic | Status: ACTIVE | Noted: 2023-01-28

## 2024-12-24 PROCEDURE — 99284 EMERGENCY DEPT VISIT MOD MDM: CPT | Mod: 25

## 2024-12-24 PROCEDURE — 12001 RPR S/N/AX/GEN/TRNK 2.5CM/<: CPT

## 2024-12-24 RX ORDER — LIDOCAINE HCL 20 MG/ML
10 VIAL (ML) INJECTION ONCE
Refills: 0 | Status: COMPLETED | OUTPATIENT
Start: 2024-12-24 | End: 2024-12-24

## 2024-12-24 RX ADMIN — Medication 10 MILLILITER(S): at 13:18

## 2024-12-24 NOTE — ED PROVIDER NOTE - PROGRESS NOTE DETAILS
dr mcmillan hand evaluated pt at bedside advised out tp follow up . pt advised on wound care. will dave

## 2024-12-24 NOTE — ED ADULT NURSE NOTE - NSICDXPASTSURGICALHX_GEN_ALL_CORE_FT
----- Message from RT Frida sent at 10/2/2018  7:34 AM CDT -----  Contact: pt    pt , requesting an appt to be worked in today: Anxiety and insomnia issues, thanks.   PAST SURGICAL HISTORY:  History of tonsillectomy

## 2024-12-24 NOTE — ED PROVIDER NOTE - ATTENDING APP SHARED VISIT CONTRIBUTION OF CARE
Patient complaining of laceration to tip of right fifth finger status post accidentally cutting herself while using a sharp knife.  Patient denies any other complaints.  Patient relates allergic to tetanus vaccine.  Patient declined x-ray.    Plan laceration repair

## 2024-12-24 NOTE — ED PROVIDER NOTE - PATIENT PORTAL LINK FT
You can access the FollowMyHealth Patient Portal offered by [facility] by registering at the following website: [email or web address]. By joining [employer]’s FollowMyHealth portal, you will also be able to view your health information using other applications (apps) compatible with our system.

## 2024-12-24 NOTE — ED ADULT NURSE NOTE - OBJECTIVE STATEMENT
patient A&Ox3 in no acute distress c/o of R hand 5 th finger laceration, cut her finger with house knife

## 2024-12-24 NOTE — ED PROVIDER NOTE - CARE PROVIDER_API CALL
Yes Abel Aguayo  Plastic Surgery  [address] [place] [address]  Phone: [phone]  Fax: [phone]  Follow Up Time: 4-6 Days

## 2024-12-24 NOTE — ED ADULT NURSE REASSESSMENT NOTE - NS ED NURSE REASSESS COMMENT FT1
patient A&Ox3 in no acute distress discharge as orders refuses the discharge vital sign , no heplock left Er self ambulated

## 2024-12-24 NOTE — ED PROVIDER NOTE - OBJECTIVE STATEMENT
Patient a 49-year-old female with no significant past medical history presents with right hand fifth digit laceration.  Patient reports she cut the tip of her pinky using a sharp knife.  Patient allergic to tetanus vaccine so was unable to receive it.  Patient denies decreased range of motion.

## 2024-12-24 NOTE — ED PROVIDER NOTE - SKIN, MLM
Skin normal color for race, warm, dry lacerated tip of right hand 5th digit Skin normal color for race, warm, dry lacerated tip of right hand 5th digit FORM NVI

## 2024-12-24 NOTE — ED PROVIDER NOTE - NSFOLLOWUPINSTRUCTIONS_ED_ALL_ED_FT
Laceration    A laceration is a cut that goes through all of the layers of the skin and into the tissue that is right under the skin. Some lacerations heal on their own. Others need to be closed with skin adhesive strips, skin glue, stitches (sutures), or staples. Proper laceration care minimizes the risk of infection and helps the laceration to heal better.  If non-absorbable stitches or staples have been placed, they must be taken out within the time frame instructed by your healthcare provider.    SEEK IMMEDIATE MEDICAL CARE IF YOU HAVE ANY OF THE FOLLOWING SYMPTOMS: swelling around the wound, worsening pain, drainage from the wound, red streaking going away from your wound, inability to move finger or toe near the laceration, or discoloration of skin near the laceration.  1. FOLLOW UP WITH YOUR PRIMARY DOCTOR IN 24-48 HOURS.   2. FOLLOW UP WITH ALL SPECIALIST DISCUSSED DURING YOUR VISIT.   3. TAKE ALL MEDICATIONS PRESCRIBED IN THE ER IF ANY ARE PRESCRIBED. CONTINUE YOUR HOME MEDICATIONS UNLESS OTHERWISE ADVISED DIFFERENTLY.   4. RETURN FOR WORSENING SYMPTOMS OR CONCERNS INCLUDING BUT NOT LIMITED TO FEVER, CHEST PAIN, OR TROUBLE BREATHING OR ANY OTHER CONCERNS  bacitracin twice daily for 10 days  keep wound dry and clean  return in 10 days for suture removal  wound check in 7 days

## 2025-05-22 NOTE — ED PROVIDER NOTE - HISTORY ATTESTATION, MLM
Treatment Goal Explanation (Does Not Render In The Note): Stable for the purposes of categorizing medical decision making is defined by the specific treatment goals for an individual patient. A patient that is not at their treatment goal is not stable, even if the condition has not changed and there is no short- term threat to life or function. I have reviewed and confirmed nurses' notes...